# Patient Record
Sex: FEMALE | Race: WHITE
[De-identification: names, ages, dates, MRNs, and addresses within clinical notes are randomized per-mention and may not be internally consistent; named-entity substitution may affect disease eponyms.]

---

## 2018-03-17 ENCOUNTER — HOSPITAL ENCOUNTER (EMERGENCY)
Dept: HOSPITAL 56 - MW.ED | Age: 63
Discharge: HOME | End: 2018-03-17
Payer: COMMERCIAL

## 2018-03-17 VITALS — SYSTOLIC BLOOD PRESSURE: 117 MMHG | DIASTOLIC BLOOD PRESSURE: 51 MMHG

## 2018-03-17 DIAGNOSIS — R11.10: Primary | ICD-10-CM

## 2018-03-17 DIAGNOSIS — K21.9: ICD-10-CM

## 2018-03-17 DIAGNOSIS — R19.7: ICD-10-CM

## 2018-03-17 DIAGNOSIS — Z88.0: ICD-10-CM

## 2018-03-17 DIAGNOSIS — E03.9: ICD-10-CM

## 2018-03-17 DIAGNOSIS — Z79.899: ICD-10-CM

## 2018-03-17 DIAGNOSIS — Z88.5: ICD-10-CM

## 2018-03-17 DIAGNOSIS — Z88.2: ICD-10-CM

## 2018-03-17 DIAGNOSIS — B34.9: ICD-10-CM

## 2018-03-17 DIAGNOSIS — Z87.891: ICD-10-CM

## 2018-03-17 DIAGNOSIS — Z98.890: ICD-10-CM

## 2018-03-17 LAB
CHLORIDE SERPL-SCNC: 105 MMOL/L (ref 98–107)
SODIUM SERPL-SCNC: 139 MMOL/L (ref 136–145)

## 2018-03-17 PROCEDURE — 80053 COMPREHEN METABOLIC PANEL: CPT

## 2018-03-17 PROCEDURE — 36415 COLL VENOUS BLD VENIPUNCTURE: CPT

## 2018-03-17 PROCEDURE — 83690 ASSAY OF LIPASE: CPT

## 2018-03-17 PROCEDURE — 87804 INFLUENZA ASSAY W/OPTIC: CPT

## 2018-03-17 PROCEDURE — 99284 EMERGENCY DEPT VISIT MOD MDM: CPT

## 2018-03-17 PROCEDURE — 85025 COMPLETE CBC W/AUTO DIFF WBC: CPT

## 2018-03-17 PROCEDURE — 81001 URINALYSIS AUTO W/SCOPE: CPT

## 2018-03-17 PROCEDURE — 96374 THER/PROPH/DIAG INJ IV PUSH: CPT

## 2018-03-17 PROCEDURE — 96361 HYDRATE IV INFUSION ADD-ON: CPT

## 2018-03-17 NOTE — EDM.PDOC
ED HPI GENERAL MEDICAL PROBLEM





- General


Chief Complaint: Gastrointestinal Problem


Stated Complaint: VOMITING/DIARRHEA


Time Seen by Provider: 03/17/18 20:43





- History of Present Illness


INITIAL COMMENTS - FREE TEXT/NARRATIVE: 





HISTORY AND PHYSICAL:





History of present illness:


Patient is a 62-year-old female who presents with a concern of vomiting 

diarrhea 1 day a concern of fluid exposure. She denies fever chills chest pain 

shortness of breath urinary symptoms vaginal discharge bleeding or other concern





Review of systems: 


As per history of present illness and below otherwise all systems reviewed and 

negative.





Past medical history: 


As per history of present illness and as reviewed below otherwise 

noncontributory.





Surgical history: 


As per history of present illness and as reviewed below otherwise 

noncontributory.





Social history: 


No reported history of drug or alcohol abuse.





Family history: 


As per history of present illness and as reviewed below otherwise 

noncontributory.





Physical exam:


HEENT: Atraumatic, normocephalic, pupils reactive, negative for conjunctival 

pallor or scleral icterus, mucous membranes dry, throat clear, neck supple, 

nontender, trachea midline.


Lungs: Clear to auscultation, breath sounds equal bilaterally, chest nontender.


Heart: S1S2, regular, negative for clicks, rubs, or JVD.


Abdomen: Soft, nondistended, nontender. Negative for masses or 

hepatosplenomegaly. Negative for costovertebral tenderness.


Pelvis: Stable nontender.


Genitourinary: Deferred.


Rectal: Deferred.


Extremities: Atraumatic, negative for cords or calf pain. Neurovascular 

unremarkable.


Neuro: Awake, alert, oriented. Cranial nerves II through XII unremarkable. 

Cerebellum unremarkable. Motor and sensory unremarkable throughout. Exam 

nonfocal.





Diagnostics:


CBC CMP influenza screen lipase UA





Therapeutics:


Saline 1 L bolus Zofran 4 mg IV





Impression: 


#1vomiting/diarrhea with dehydration probable viral syndrome





Definitive disposition and diagnosis as appropriate pending reevaluation and 

review of above.





- Related Data


 Allergies











Allergy/AdvReac Type Severity Reaction Status Date / Time


 


codeine Allergy  Nausea Verified 03/17/18 20:49


 


Penicillins Allergy  Hives Verified 03/17/18 20:49


 


Sulfa (Sulfonamide Allergy  Vomiting Verified 03/17/18 20:49





Antibiotics)     











Home Meds: 


 Home Meds





Levothyroxine Sodium [Synthroid] 125 mcg PO DAILY 12/09/14 [History]


Omeprazole Magnesium [Prilosec Otc] 20 mg PO ASDIRECTED PRN 12/09/14 [History]











Past Medical History


Gastrointestinal History: Reports: Diverticulosis, GERD


OB/GYN History: Reports: Pregnancy


Other OB/BYN History: Cyst on her fallopian tube


Endocrine/Metabolic History: Reports: Hypothyroidism





- Infectious Disease History


Infectious Disease History: Reports: Chicken Pox, Mumps





- Past Surgical History


HEENT Surgical History: Reports: Adenoidectomy, LASIK, Tonsillectomy, Other (

See Below)


GI Surgical History: Reports: Cholecystectomy, Colonoscopy, Other (See Below)


Other GI Surgeries/Procedures: sigmoid colon growth, surgery





Social & Family History





- Family History


Family Medical History: Noncontributory





- Tobacco Use


Smoking Status *Q: Former Smoker


Used Tobacco, but Quit: Yes


Month/Year Tobacco Last Used: 1981





- Caffeine Use


Caffeine Use: Reports: Coffee





- Alcohol Use


Number of Drinks Per Day: 0





- Recreational Drug Use


Recreational Drug Use: Yes


Drug Use in Last 12 Months: No





ED ROS GENERAL





- Review of Systems


Review Of Systems: ROS reveals no pertinent complaints other than HPI.





ED EXAM, GENERAL





- Physical Exam


Exam: See Below (See dictation)





Course





- Vital Signs


Last Recorded V/S: 


 Last Vital Signs











Temp  36.8 C   03/17/18 20:47


 


Pulse  95   03/17/18 20:47


 


Resp  12   03/17/18 20:47


 


BP  129/66   03/17/18 20:47


 


Pulse Ox  93 L  03/17/18 20:47














- Orders/Labs/Meds


Orders: 


 Active Orders 24 hr











 Category Date Time Status


 


 COMPREHENSIVE METABOLIC PN,CMP [CHEM] Stat Lab  03/17/18 21:08 Received


 


 INFLUENZA A+B AG SCREEN [RM] Stat Lab  03/17/18 21:08 Received


 


 LIPASE [CHEM] Stat Lab  03/17/18 21:08 Received


 


 UA W/MICROSCOPIC [URIN] Stat Lab  03/17/18 21:05 Received


 


 Sodium Chloride 0.9% [Normal Saline] 1,000 ml Med  03/17/18 20:57 Active





 IV STAT   








 Medication Orders





Sodium Chloride (Normal Saline)  1,000 mls @ 999 mls/hr IV STAT ONE


   Stop: 03/17/18 21:57


   Last Admin: 03/17/18 21:15  Dose: 999 mls/hr








Labs: 


 Laboratory Tests











  03/17/18 Range/Units





  21:08 


 


WBC  9.64  (4.0-11.0)  K/uL


 


RBC  4.36  (4.30-5.90)  M/uL


 


Hgb  13.9  (12.0-16.0)  g/dL


 


Hct  41.7  (36.0-46.0)  %


 


MCV  95.6  (80.0-98.0)  fL


 


MCH  31.9  (27.0-32.0)  pg


 


MCHC  33.3  (31.0-37.0)  g/dL


 


RDW Std Deviation  47.1  (28.0-62.0)  fl


 


RDW Coeff of Parag  13  (11.0-15.0)  %


 


Plt Count  244  (150-400)  K/uL


 


MPV  9.80  (7.40-12.00)  fL


 


Neut % (Auto)  93.9 H  (48.0-80.0)  %


 


Lymph % (Auto)  3.5 L  (16.0-40.0)  %


 


Mono % (Auto)  2.6  (0.0-15.0)  %


 


Eos % (Auto)  0.0  (0.0-7.0)  %


 


Baso % (Auto)  0.0  (0.0-1.5)  %


 


Neut # (Auto)  9.1 H  (1.4-5.7)  K/uL


 


Lymph # (Auto)  0.3 L  (0.6-2.4)  K/uL


 


Mono # (Auto)  0.3  (0.0-0.8)  K/uL


 


Eos # (Auto)  0.0  (0.0-0.7)  K/uL


 


Baso # (Auto)  0.0  (0.0-0.1)  K/uL


 


Nucleated RBC %  0.0  /100WBC


 


Nucleated RBCs #  0  K/uL











Meds: 


Medications











Generic Name Dose Route Start Last Admin





  Trade Name Freq  PRN Reason Stop Dose Admin


 


Sodium Chloride  1,000 mls @ 999 mls/hr  03/17/18 20:57  03/17/18 21:15





  Normal Saline  IV  03/17/18 21:57  999 mls/hr





  STAT ONE   Administration














Discontinued Medications














Generic Name Dose Route Start Last Admin





  Trade Name Freq  PRN Reason Stop Dose Admin


 


Ondansetron HCl  4 mg  03/17/18 20:57  03/17/18 21:15





  Zofran  IVPUSH  03/17/18 20:58  4 mg





  ONETIME ONE   Administration


 


Ondansetron HCl  Confirm  03/17/18 21:06  03/17/18 21:17





  Zofran  Administered  03/17/18 21:07  Not Given





  Dose   





  4 mg   





  .ROUTE   





  .STK-MED ONE   














Departure





- Departure


Time of Disposition: 21:27


Disposition: Home, Self-Care 01


Condition: Good


Clinical Impression: 


 Vomiting, Diarrhea, Viral syndrome








- Discharge Information


Referrals: 


Mohan Palma MD [Primary Care Provider] - 


Forms:  ED Department Discharge


Additional Instructions: 


The following information is given to patients seen in the emergency department 

who are being discharged to home. This information is to outline your options 

for follow-up care. We provide all patients seen in our emergency department 

with a follow-up referral.





The need for follow-up, as well as the timing and circumstances, are variable 

depending upon the specifics of your emergency department visit.





If you don't have a primary care physician on staff, we will provide you with a 

referral. We always advise you to contact your personal physician following an 

emergency department visit to inform them of the circumstance of the visit and 

for follow-up with them and/or the need for any referrals to a consulting 

specialist.





The emergency department will also refer you to a specialist when appropriate. 

This referral assures that you have the opportunity for followup care with a 

specialist. All of these measure are taken in an effort to provide you with 

optimal care, which includes your followup.





Under all circumstances we always encourage you to contact your private 

physician who remains a resource for coordinating  your care. When calling for 

followup care, please make the office aware that this follow-up is from your 

recent emergency room visit. If for any reason you are refused follow-up, 

please contact the [facility] emergency department at [phone] 

and asked to speak to the emergency department charge nurse.

















Push fluids clear liquids 24 hours now Marilin 72 hours follow-up primary 

medical doctor as discussed and return as needed as discussed





- My Orders


Last 24 Hours: 


My Active Orders





03/17/18 20:57


Sodium Chloride 0.9% [Normal Saline] 1,000 ml IV STAT 





03/17/18 21:05


UA W/MICROSCOPIC [URIN] Stat 





03/17/18 21:08


COMPREHENSIVE METABOLIC PN,CMP [CHEM] Stat 


INFLUENZA A+B AG SCREEN [RM] Stat 


LIPASE [CHEM] Stat 














- Assessment/Plan


Last 24 Hours: 


My Active Orders





03/17/18 20:57


Sodium Chloride 0.9% [Normal Saline] 1,000 ml IV STAT 





03/17/18 21:05


UA W/MICROSCOPIC [URIN] Stat 





03/17/18 21:08


COMPREHENSIVE METABOLIC PN,CMP [CHEM] Stat 


INFLUENZA A+B AG SCREEN [RM] Stat 


LIPASE [CHEM] Stat